# Patient Record
(demographics unavailable — no encounter records)

---

## 2025-02-04 NOTE — ASSESSMENT
[FreeTextEntry1] : I, Dr. Sloan Spivey, personally performed the evaluation and management (E/M) services for this new patient.  That E/M includes conducting the clinically appropriate initial history &/or exam, assessing all conditions, and establishing the plan of care.  Today, my NOLE, DAWSON Johnson, examined and treated the patient  following the established plan of care we created.

## 2025-02-04 NOTE — HISTORY OF PRESENT ILLNESS
[FreeTextEntry1] : 4 year old patient presents to the office for a right orbital cyst, present for 3 years.  Patient has not been seen by Dermatology. No previous treatments. No itching, bleeding, or drainage. No Imaging/Biopsy. Slowly growing, no change in color. No Infection or inflammation. No pain or discomfort. No personal hx of masses, moles, lesions.  No family hx of skin cancer. H/o cough variant asthma., otherwise no medical hx, FT birth. Mom reports that she has not needed to use Albuterol

## 2025-04-24 NOTE — REASON FOR VISIT
[Post Op: _________] : a [unfilled] post op visit [FreeTextEntry1] :  The 4 year old patient presents today s/p right orbital cyst removal on 4/16/25.Patient accompanied by parent. Denies any concerns today.